# Patient Record
Sex: FEMALE | Race: WHITE | NOT HISPANIC OR LATINO | URBAN - METROPOLITAN AREA
[De-identification: names, ages, dates, MRNs, and addresses within clinical notes are randomized per-mention and may not be internally consistent; named-entity substitution may affect disease eponyms.]

---

## 2019-05-08 ENCOUNTER — OFFICE VISIT (OUTPATIENT)
Dept: URBAN - METROPOLITAN AREA CLINIC 17 | Facility: CLINIC | Age: 59
End: 2019-05-08
Payer: COMMERCIAL

## 2019-05-08 DIAGNOSIS — H40.013 OPEN ANGLE WITH BORDERLINE FINDINGS, LOW RISK, BILATERAL: Primary | ICD-10-CM

## 2019-05-08 DIAGNOSIS — H17.89 OTHER CORNEAL SCARS AND OPACITIES: ICD-10-CM

## 2019-05-08 DIAGNOSIS — H25.13 AGE-RELATED NUCLEAR CATARACT, BILATERAL: ICD-10-CM

## 2019-05-08 PROCEDURE — 92004 COMPRE OPH EXAM NEW PT 1/>: CPT | Performed by: OPTOMETRIST

## 2019-05-08 PROCEDURE — 92133 CPTRZD OPH DX IMG PST SGM ON: CPT | Performed by: OPTOMETRIST

## 2019-05-08 PROCEDURE — 76514 ECHO EXAM OF EYE THICKNESS: CPT | Performed by: OPTOMETRIST

## 2019-05-08 ASSESSMENT — INTRAOCULAR PRESSURE
OD: 19
OS: 19
OD: 21
OS: 20

## 2019-05-08 NOTE — IMPRESSION/PLAN
Impression: Open angle with borderline findings, low risk, bilateral: H40.013.
(-) family Hx of glaucoma IOP stable OU Plan: No signs of glaucoma, no treatment is recommended at this time. Will continue to monitor. Orig.  IOP 22/22, OCT 81?/87?(81?/78), VF- no previous testing YESI

## 2019-05-08 NOTE — IMPRESSION/PLAN
Impression: Other corneal scars and opacities: H17.89. S/p RK scars Plan: No treatment is required at this time. Will continue to observe condition and or symptoms.

## 2021-07-02 ENCOUNTER — OFFICE VISIT (OUTPATIENT)
Dept: URBAN - METROPOLITAN AREA CLINIC 17 | Facility: CLINIC | Age: 61
End: 2021-07-02
Payer: OTHER MISCELLANEOUS

## 2021-07-02 DIAGNOSIS — H25.12 AGE-RELATED NUCLEAR CATARACT, LEFT EYE: ICD-10-CM

## 2021-07-02 PROCEDURE — 99204 OFFICE O/P NEW MOD 45 MIN: CPT | Performed by: OPHTHALMOLOGY

## 2021-07-02 ASSESSMENT — INTRAOCULAR PRESSURE
OS: 18
OD: 21

## 2021-07-02 ASSESSMENT — VISUAL ACUITY
OS: 20/70
OD: 20/80

## 2021-07-02 NOTE — IMPRESSION/PLAN
Impression: Age-related nuclear cataract, bilateral: H25.13. Condition: established, worsening. Symptoms: could improve with surgery. RK OU  Plan: Cataract accounts for patient's complaints. Reviewed risks, benefits, and procedure. Patient desires surgery, schedule ce/iol OD then OS, RL2, can consider toric lens not multifocal, distance refractive target, patient is clear for surgery in [place] [id number]. Discussed with patient the possible need for second surgery due to history of RK OU. Recommend ORA and [facility] in [place] to help prevent secondary surgery.  Will need [facility] at [facility]

## 2021-07-14 ENCOUNTER — PRE-OPERATIVE VISIT (OUTPATIENT)
Dept: URBAN - METROPOLITAN AREA CLINIC 23 | Facility: CLINIC | Age: 61
End: 2021-07-14
Payer: OTHER MISCELLANEOUS

## 2021-07-14 ASSESSMENT — PACHYMETRY
OD: 3.89
OS: 26.14
OS: 4.30
OD: 26.50

## 2021-07-19 ENCOUNTER — SURGERY (OUTPATIENT)
Dept: URBAN - METROPOLITAN AREA SURGERY 7 | Facility: SURGERY | Age: 61
End: 2021-07-19
Payer: OTHER MISCELLANEOUS

## 2021-07-19 PROCEDURE — 66984 XCAPSL CTRC RMVL W/O ECP: CPT | Performed by: OPHTHALMOLOGY

## 2021-07-20 ENCOUNTER — POST-OPERATIVE VISIT (OUTPATIENT)
Dept: URBAN - METROPOLITAN AREA CLINIC 17 | Facility: CLINIC | Age: 61
End: 2021-07-20
Payer: OTHER MISCELLANEOUS

## 2021-07-20 PROCEDURE — 99024 POSTOP FOLLOW-UP VISIT: CPT | Performed by: OPTOMETRIST

## 2021-07-20 ASSESSMENT — INTRAOCULAR PRESSURE
OD: 20
OS: 18

## 2021-07-20 NOTE — IMPRESSION/PLAN
Impression: S/P Cataract Extraction by phacoemulsification with IOL placement [id number] OD - 1 Day. Encounter for surgical aftercare following surgery on a sense organ  Z48.810. Post operative instructions reviewed - Advised patient she will likely need Rx glasses after surgery due to hx of RK. Plan: Return in 1 week.

## 2021-07-29 ENCOUNTER — POST-OPERATIVE VISIT (OUTPATIENT)
Dept: URBAN - METROPOLITAN AREA CLINIC 17 | Facility: CLINIC | Age: 61
End: 2021-07-29
Payer: OTHER MISCELLANEOUS

## 2021-07-29 DIAGNOSIS — Z48.810 ENCOUNTER FOR SURGICAL AFTERCARE FOLLOWING SURGERY ON A SENSE ORGAN: Primary | ICD-10-CM

## 2021-07-29 PROCEDURE — 99024 POSTOP FOLLOW-UP VISIT: CPT | Performed by: OPTOMETRIST

## 2021-07-29 ASSESSMENT — INTRAOCULAR PRESSURE
OS: 18
OD: 21

## 2021-07-29 ASSESSMENT — VISUAL ACUITY
OD: 20/40
OS: 20/200

## 2021-07-29 NOTE — IMPRESSION/PLAN
Impression: S/P Cataract Extraction by phacoemulsification with IOL placement [id number] OD - 10 Days. Encounter for surgical aftercare following surgery on a sense organ  Z48.810. Excellent post op course   Post operative instructions reviewed - Condition is improving - Plan: Ok to proceed with 2nd eye --Advised patient to use artificial tears for comfort.

## 2021-08-02 ENCOUNTER — SURGERY (OUTPATIENT)
Dept: URBAN - METROPOLITAN AREA SURGERY 7 | Facility: SURGERY | Age: 61
End: 2021-08-02
Payer: OTHER MISCELLANEOUS

## 2021-08-02 PROCEDURE — 66984 XCAPSL CTRC RMVL W/O ECP: CPT | Performed by: OPHTHALMOLOGY

## 2021-08-03 ENCOUNTER — POST-OPERATIVE VISIT (OUTPATIENT)
Dept: URBAN - METROPOLITAN AREA CLINIC 17 | Facility: CLINIC | Age: 61
End: 2021-08-03
Payer: OTHER MISCELLANEOUS

## 2021-08-03 DIAGNOSIS — Z96.1 PRESENCE OF INTRAOCULAR LENS: Primary | ICD-10-CM

## 2021-08-03 PROCEDURE — 99024 POSTOP FOLLOW-UP VISIT: CPT | Performed by: OPTOMETRIST

## 2021-08-03 ASSESSMENT — INTRAOCULAR PRESSURE
OD: 22
OS: 22

## 2021-08-03 NOTE — IMPRESSION/PLAN
Impression: S/P Cataract Extraction by phacoemulsification with IOL placement [id number] OS - 1 Day. Presence of intraocular lens  Z96.1. Plan: PO2 --Advised patient to use artificial tears for comfort.

## 2021-08-24 ENCOUNTER — POST-OPERATIVE VISIT (OUTPATIENT)
Dept: URBAN - METROPOLITAN AREA CLINIC 17 | Facility: CLINIC | Age: 61
End: 2021-08-24
Payer: OTHER MISCELLANEOUS

## 2021-08-24 PROCEDURE — 99024 POSTOP FOLLOW-UP VISIT: CPT | Performed by: OPTOMETRIST

## 2021-08-24 ASSESSMENT — INTRAOCULAR PRESSURE
OD: 15
OS: 15

## 2021-08-24 NOTE — IMPRESSION/PLAN
Impression: S/P Cataract Extraction by phacoemulsification with IOL placement [id number] OS - 22 Days. Encounter for surgical aftercare following surgery on a sense organ  Z48.810. Plan: PRN pt to return to prior OD for new glasses. --Advised patient to use artificial tears for comfort.